# Patient Record
Sex: FEMALE | Race: OTHER | URBAN - METROPOLITAN AREA
[De-identification: names, ages, dates, MRNs, and addresses within clinical notes are randomized per-mention and may not be internally consistent; named-entity substitution may affect disease eponyms.]

---

## 2022-01-20 ENCOUNTER — HOSPITAL ENCOUNTER (OUTPATIENT)
Dept: PHYSICAL THERAPY | Age: 27
Discharge: HOME | End: 2022-01-20
Payer: COMMERCIAL

## 2022-01-20 DIAGNOSIS — M22.2X2 PATELLOFEMORAL SYNDROME OF BOTH KNEES: ICD-10-CM

## 2022-01-20 DIAGNOSIS — M25.562 PAIN IN BOTH KNEES, UNSPECIFIED CHRONICITY: ICD-10-CM

## 2022-01-20 DIAGNOSIS — M25.561 PAIN IN BOTH KNEES, UNSPECIFIED CHRONICITY: ICD-10-CM

## 2022-01-20 DIAGNOSIS — M22.2X1 PATELLOFEMORAL SYNDROME OF BOTH KNEES: ICD-10-CM

## 2022-01-20 PROCEDURE — 97110 THERAPEUTIC EXERCISES: CPT

## 2022-01-20 PROCEDURE — 97161 PT EVAL LOW COMPLEX 20 MIN: CPT

## 2022-01-20 PROCEDURE — 97140 MANUAL THERAPY 1/> REGIONS: CPT

## 2022-01-20 NOTE — PROGRESS NOTES
Bea Myers  : 1995  Payor: Ernesto Myrick / Plan: [facility] / Product Type: PPO /  [address]  at [address]  [address], [address]  [phone]   [phone]                                                            Carlos [facility] Jeremy [place]stud[place], [place]      OUTPATIENT PHYSICAL THERAPY: Daily Treatment Note 2022 Visit Count:  Visit count could not be calculated. Make sure you are using a visit which is associated with an episode. Tx Diagnosis:  Pain in right knee (M25.561)  Pain in left knee (M25.562)  Patellofemoral disorders, right knee (M22.2X1)  Patellofemoral disorders, left knee (M22.2X2)      LEFT KNEE WORSE THAN RIGHT. PFPS, TRACKING ISSUE PRESENT       Pre-treatment Symptoms/Complaints: See Initial Eval Dated 22 for more details. Pain: Initial:0/10  Medications Last Reviewed:  2022     Post Session: 0/10   Updated Objective Findings: See Initial Eval for more details. TREATMENT:   THERAPEUTIC EXERCISE: (10 minutes):  Exercises per grid below to improve mobility, strength and balance. Required minimal visual, verbal and manual cues to promote proper body alignment and promote proper body posture. Progressed resistance and complexity of movement as indicated. Date:  2022 Date:   Date:     Activity/Exercise Parameters Parameters Parameters   Education HEP, POC, PT goals, anatomy/pathology     Clamshells 10X10\" Yellow     SLR 10X10\"                                         MANUAL THERAPY: (15 minutes): Joint mobilization, Soft tissue mobilization was utilized and necessary because of the patient's restricted joint motion and restricted motion of soft tissue mobility. Date  2022    Technique Used Grade  Level # Time(s) Effect while being performed   Ute Taping    B knees, L > R                                                           HEP Log Date 1.    2022   2.  2022   3. 1/20/2022   4.    5.           Small[employer] Portal  Treatment/Session Summary:    Response to Treatment: Pt demonstrated understanding of POC and initial HEP. No increase in pain or adverse reactions. Improvement with Bucio taping B - no pain with side lunge   Communication/Consultation:  POC, HEP, PT goals, Faxed initial evaluation to MD.   Equipment provided today: HEP Handout   Recommendations/Intent for next treatment session:   Next visit will focus on Manual Therapy Core Stability Quad strengthening Hip strengthening soft tissue mobilization. Treatment Plan of Care Effective Dates: 1/20/2022 TO 4/20/2022 (90 days).   Frequency/Duration: 2 times a week for 90 Days             Total Treatment Billable Duration:   25  Rx plus Eval      Liz Reynoso DPT    Future Appointments   Date Time Provider Eddi Morales   1/20/2022 11:00 AM DAVE Streeter   2/24/2022  1:00 PM Olive Spence PA SSA POAI POA

## 2022-01-20 NOTE — THERAPY EVALUATION
Willi Bermudez  : 1995  Primary: Nadia Rome Of Micah Rojas*  Secondary:  [facility]  [address], [address]  [phone]   [phone]           OUTPATIENT PHYSICAL THERAPY:Initial Assessment 2022    ICD-10: Treatment Diagnosis:  Pain in right knee (M25.561)  Pain in left knee (M25.562)  Patellofemoral disorders, right knee (M22.2X1)  Patellofemoral disorders, left knee (M22.2X2)          Precautions/Allergies:   Patient has no known allergies. Fall Risk Score: 0 (? 5 = High Risk)  MD Orders: Eval and Treat  MEDICAL/REFERRING DIAGNOSIS:  Pain in both knees, unspecified chronicity [M25.561, M25.562]  Patellofemoral syndrome of both knees [M22.2X1, M22.2X2]   DATE OF ONSET: 22  REFERRING PHYSICIAN: Carlos Spence PA  RETURN PHYSICIAN APPOINTMENT: 22     INITIAL ASSESSMENT:  Ms. Juno Peres presents to physical therapy with decreased strength, ROM, joint mobility, functional mobility. These S/S are consistent with patella pain bilaterally---likely patellofemoral pain 'syndrome'  / tracking difficulty--I expect this to improve with short bout of taping (Rupinder) as well as strengthening        . Patient will benefit from skilled physical therapy for manual therapeutic techniques (as appropriate), therapeutic exercises and activities, balance and comprehensive home exercises program to address current impairments and functional limitations. Willi Bermudez will benefit from skilled PT (medically necessary) in order to address above deficits affecting participation in basic ADLs and overall functional tolerance. PROBLEM LIST (Impacting functional limitations):   1. Decreased Strength  2. Decreased ADL/Functional Activities  3. Decreased Transfer Abilities  4. Decreased Ambulation Ability/Technique  5. Decreased Balance  6. Increased Pain  7. Decreased Joint mobility/Flexibility   8.  Decreased Activity Tolerance  9. Decreased [place] with Home Exercise Program INTERVENTIONS PLANNED:   1. Balance Exercise  2. Bed Mobility  3. Cold  4. Cryotherapy  5. Family Education  6. Gait Training  7. Heat  8. Home Exercise Program (HEP)  9. Manual Therapy  10. Neuromuscular Re-education/Strengthening  11. Range of Motion (ROM)  12. Therapeutic Activites  13. Therapeutic Exercise/Strengthening  14. Transfer Training  15. Ultrasound (US)  16. Vasopneumatic Compression  17. Aquatic Therapy          TREATMENT PLAN:  Effective Dates: 1/20/2022 TO 4/20/2022 (90 days). Frequency/Duration: 2 times a week for 90 Days    GOALS: (Goals have been discussed and agreed upon with patient.)   Short-Term Goals~8 weeks  Goal Met   1. Unknown Bench will be independent with HEP for strength and ROM 1.  [] Date:   2. Unknown  will be able to work out without restrictions 2. [] Date:   3. Unknown Bench will be able to negotiate stairs without pain \"(primarily descent) 3. [] Date:   4. Unknown  will be able to drive car (manual transmission) without pain. 4.  [] Date:          Outcome Measure: Tool Used: Knee Injury and Osteoarthritis Outcome Form (KOOS-JR.)  SCORE: None (0) Mild (1) Moderate (2) Severe (3) Extreme (4)   Stiffness:         1. How severe is your knee stiffness after first waking in the morning? [x] [] [] [] []   Pain:         What amount of knee pain have you experienced in the last week   doing the following activities? 2. Twisting/pivoting on your knee: [] [] [x] [] []   3. Straightening knee fully: [] [] [x] [] []   4. Going up or down stairs [] [] [x] [] []   5. Standing upright [] [x] [] [] []   Function, daily living        6. Rising from sitting [] [x] [] [] []   7.  Bending to floor/ an object [] [x] [] [] []     Score:  Initial: 9 (Interval: 63.776) 1/20/2022/28 Most Recent: TBD/28 (Date: -- )   Interpretation of Score: Questions each scored on a 4 point scale with 4 representing the worst possible score and 0 representing the best possible score. The higher the point total, the worse the knee pain translating to a lower percentage of patient functioning. Medical Necessity:   · Skilled intervention continues to be required due to current impairment. Reason for Services/Other Comments:  · Patient continues to require skilled intervention due to patient continues to present with impairments assessed at initial evaluation and requiring skilled physical therapy to meet goals for PT. Total Treatment Duration:         Rehabilitation Potential For Stated Goals: Good  Regarding Mayelin Kam Miller's therapy, I certify that the treatment plan above will be carried out by a therapist or under their direction. Thank you for this referral,  Paddy Prieto DPT     Referring Physician Signature: SOILA Hutchison              Date                  HISTORY:   History of Present Injury/Illness (Reason for Referral):  *I started having knee pain Summer of 2020. I gained weight and started to work out and my knee pain got so bad I couldn't barely go up and down stairs. I was following [employer] videos for working out but my knees were unable to do it. My family has bad hx of OA so eventually in November I got a  and I told my  about it and we don't do things that hurt my knees. I'm afraid of the pain I had the year before last year. I like to [place] with FlexFit and we do a lot of core work and DB weights - I go 3x/week but now 2x/week. -Present Symptoms (on day of evaluation):       Pain Severity:  · Currently: 0/10  · Best: 0/10  · Worst: 4/10 curtsy lunges, side lunges    · Aggravating factors: curtsy lunges   · Relieving factors: Rest  · Irritability: Low (Onset of pain is is longer than the time it takes for Pain to go away)    Past Medical History/Comorbidities:   Ms. Karen Escudero  has no past medical history on file.   Ms. Karen Escudero  has no past surgical history on file.    Active Ambulatory Problems     Diagnosis Date Noted    No Active Ambulatory Problems     Resolved Ambulatory Problems     Diagnosis Date Noted    No Resolved Ambulatory Problems     No Additional Past Medical History     Social History/Living Environment:        Social History     Socioeconomic History    Marital status: SINGLE     Spouse name: Not on file    Number of children: Not on file    Years of education: Not on file    Highest education level: Not on file   Occupational History    Not on file   Tobacco Use    Smoking status: Never Smoker    Smokeless tobacco: Never Used   Substance and Sexual Activity    Alcohol use: Not on file    Drug use: Not on file    Sexual activity: Not on file   Other Topics Concern    Not on file   Social History Narrative    Not on file     Social Determinants of Health     Financial Resource Strain:     Difficulty of Paying Living Expenses: Not on file   Food Insecurity:     Worried About Running Out of Food in the Last Year: Not on file    [place] of Food in the Last Year: Not on file   Transportation Needs:     Lack of Transportation (Medical): Not on file    Lack of Transportation (Non-Medical):  Not on file   Physical Activity: Insufficiently Active    Days of Exercise per Week: 3 days    Minutes of Exercise per Session: 30 min   Stress:     Feeling of Stress : Not on file   Social Connections:     Frequency of Communication with Friends and Family: Not on file    Frequency of Social Gatherings with Friends and Family: Not on file    Attends Pentecostalism Services: Not on file    Active Member of Clubs or Organizations: Not on file    Attends Club or Organization Meetings: Not on file    Marital Status: Not on file   Intimate Partner Violence: Not At Risk    Fear of Current or Ex-Partner: No    Emotionally Abused: No    Physically Abused: No    Sexually Abused: No   Housing Stability:     Unable to Pay for Housing in the Last Year: Not on file    Number of Places Lived in the Last Year: Not on file    Unstable Housing in the Last Year: Not on file     Prior Level of Function/Work/Activity:  Normal  Previous Treatment Approach  none  Other Clinical Tests:  none  Current Medications:    Current Outpatient Medications:     naproxen (NAPROSYN) 500 mg tablet, Take 1 Tablet by mouth two (2) times daily (with meals). , Disp: 28 Tablet, Rfl: 0      Ambulatory/Rehab Services H2 Model Falls Risk Assessment    Risk Factors:       No Risk Factors Identified Ability to Rise from Chair:       (0)  Ability to rise in a single movement    Falls Prevention Plan:       No modifications necessary   Total: (5 or greater = High Risk): 0    ©2010 [facility] [employer]. All Rights Reserved. [facility] Patent #[id number]. Federal Law prohibits the replication, distribution or use without written permission from [facility] The car easily beat         Date Last Reviewed:  1/20/2022   Number of Personal Factors/Comorbidities that affect the Plan of Care: 0: LOW COMPLEXITY   EXAMINATION:   Observation/Orthostatic Postural Assessment:   Gait:  Normal      KNEE Left  Right   Skin Intact Intact   Atrophy None None   Effusion none none   ROM  Full no pain Full  No pain   Palpation Tender over the lateral patella facet and mild medial joint  over  lateral patella facet   Patellar Tracking Normal: J sign: negative.  Same   Crepitus 1+ 1+   Patellar Apprehension Negative Negative   Melissa Test  negative  negative   Pivot Shift Negative Negative   Post Drawer Negative Negative   Varus  @ 0 and 30deg Negative Negative   Valgus @ 0 and 30deg Negative Negative   Gait Normal  Normal          Strength:     Eval Date:  Re-Assess Date:  Re-Assess Date:      RIGHT LEFT RIGHT LEFT RIGHT LEFT   Knee Flexion  5/5  5/5           Knee Extension  5/5  5/5           Hip Flexion  5/5  5/5           Hip Abduction  5/5  5/5           Hip Extension  5/5  5/5           Ankle Dorsiflexion   5/5 5/5            5/5 5/5             Special Tests:   Lachman: Negative  Anterior Drawer: Negative  Valgus Stress: Negative  Varus Stress: Negative  Melissa: Negative    Neurological Screen:  No radiating symptoms down leg    Functional Mobility:   Sit to Stand=  Squat=  Single Leg Step Down=      Balance and Mobility:  Test Result   Timed up and Go <10 Seconds   30 second Sit to Stand           Body Structures Involved:  1. Bones  2. Joints  3. Muscles  4. Ligaments Body Functions Affected:  1. Sensory/Pain  2. Neuromusculoskeletal  3. Movement Related Activities and Participation Affected:  1. Mobility  2. Self Care   Number of elements that affect the Plan of Care: 4+: HIGH COMPLEXITY   CLINICAL PRESENTATION:   Presentation: Stable and uncomplicated: LOW COMPLEXITY   CLINICAL DECISION MAKING:      Use of outcome tool(s) and clinical judgement create a POC that gives a: Clear prediction of patient's progress: LOW COMPLEXITY   See treatment note for associated treatment provided today.       Future Appointments   Date Time Provider Eddi Morales   2/24/2022  1:00 PM Carlos [facility] Elissa [place], [address] [facility] [place], [place]

## 2022-01-25 ENCOUNTER — HOSPITAL ENCOUNTER (OUTPATIENT)
Dept: PHYSICAL THERAPY | Age: 27
Discharge: HOME | End: 2022-01-25
Payer: COMMERCIAL

## 2022-01-25 PROCEDURE — 97110 THERAPEUTIC EXERCISES: CPT

## 2022-01-25 PROCEDURE — 97140 MANUAL THERAPY 1/> REGIONS: CPT

## 2022-01-25 NOTE — PROGRESS NOTES
Macie Fantasma  : 1995  Payor: Marlo Wheeler / Plan: [facility] / Product Type: PPO /  [address]  at [facility]  [address], [address]  [phone]   [phone]                                                            [facility] [facility] Elissa [place], [address]      OUTPATIENT PHYSICAL THERAPY: Daily Treatment Note 2022 Visit Count:  2    Tx Diagnosis:  Pain in right knee (M25.561)  Pain in left knee (M25.562)  Patellofemoral disorders, right knee (M22.2X1)  Patellofemoral disorders, left knee (M22.2X2)      LEFT KNEE WORSE THAN RIGHT. PFPS, TRACKING ISSUE PRESENT       Pre-treatment Symptoms/Complaints: See Initial Eval Dated 22 for more details. Patient reports both knees have been hurting some. Pain: Initial:0/10  Medications Last Reviewed:  2022     Post Session: 0/10   Updated Objective Findings: See Initial Eval for more details. TREATMENT:   THERAPEUTIC EXERCISE: (45 minutes):  Exercises per grid below to improve mobility, strength and balance. Required minimal visual, verbal and manual cues to promote proper body alignment and promote proper body posture. Progressed resistance and complexity of movement as indicated. Date:  2022 Date:   Date:     Activity/Exercise Parameters Parameters Parameters   Education HEP, POC, PT goals, anatomy/pathology     Clamshells 10X10\" Yellow     SLR 10X10\" # 4     Hip abd X 30 B     bridging X 30 B     shuttle X 20 # 75 B     Nu step X 10 level 2     Single leg bridging X 30 B     Standing hamstring curl X 30 # 4                       MANUAL THERAPY: (10 minutes): Joint mobilization, Soft tissue mobilization was utilized and necessary because of the patient's restricted joint motion and restricted motion of soft tissue mobility.         Date  2022    Technique Used Grade  Level # Time(s) Effect while being performed   Ute Taping    B knees, L > R    Patella mobs To decrease tightness and tenderness                                                   HEP Log Date 1.    1/25/2022   2.  1/25/2022   3. 1/25/2022   4.    5.           [employer] Portal  Treatment/Session Summary:    Response to Treatment: Pt demonstrated understanding of POC and initial HEP. No increase in pain or adverse reactions. Patient tolerated treatment well today. Needs to continue to work on quad and hip strength. Communication/Consultation:  POC, HEP, PT goals, Faxed initial evaluation to MD.   Equipment provided today: HEP Handout   Recommendations/Intent for next treatment session:   Next visit will focus on Manual Therapy Core Stability Quad strengthening Hip strengthening soft tissue mobilization. Treatment Plan of Care Effective Dates: 1/25/2022 TO 4/20/2022 (90 days).   Frequency/Duration: 2 times a week for 90 Days             Total Treatment Billable Duration:   45  Rx   PT Patient Time In/Time Out  Time In: 1100  Time Out: [address] [place], [place]    Future Appointments   Date Time Provider Eddi Morales   1/27/2022  7:00 PM [facility]   2/1/2022 11:00 AM [facility]   2/3/2022 11:00 AM [place] [facility]   2/8/2022 11:00 AM [place] [facility]   2/10/2022 11:00 AM [place] [facility]   2/15/2022 11:00 AM [place] [facility]   2/17/2022 11:00 [facility]   2/22/2022 11:00 AM [place] [facility]   2/24/2022  1:00 PM Adonay Spence PA SSA POAI POA

## 2022-01-27 ENCOUNTER — HOSPITAL ENCOUNTER (OUTPATIENT)
Dept: PHYSICAL THERAPY | Age: 27
Discharge: HOME | End: 2022-01-27
Payer: COMMERCIAL

## 2022-01-27 NOTE — PROGRESS NOTES
Prateek Mahmood  : 1995  Primary: Mariella Hernández Of Micah Rojas*  Secondary:  [facility]  [address], [facility] [address]  [phone]   [phone]      OUTPATIENT DAILY NOTE    NAME/AGE/GENDER: Prateek Mahmood is a 23 y.o. female. DATE: 2022    Ms. Natalia Heardch for today's appointment due to schedule conflcit.  Pramod Llanes, PTA

## 2022-02-01 ENCOUNTER — HOSPITAL ENCOUNTER (OUTPATIENT)
Dept: PHYSICAL THERAPY | Age: 27
Discharge: HOME | End: 2022-02-01
Payer: COMMERCIAL

## 2022-02-01 PROCEDURE — 97110 THERAPEUTIC EXERCISES: CPT

## 2022-02-01 NOTE — PROGRESS NOTES
Marcel Fail  : 1995  Payor: Albertha Osler / Plan: [facility] / Product Type: PPO /  [address]  at [address]  [address], [address]  [phone]   [phone]                                                            mya [facility] [place], [place]      OUTPATIENT PHYSICAL THERAPY: Daily Treatment Note 2022 Visit Count:  3    Tx Diagnosis:  Pain in right knee (M25.561)  Pain in left knee (M25.562)  Patellofemoral disorders, right knee (M22.2X1)  Patellofemoral disorders, left knee (M22.2X2)      LEFT KNEE WORSE THAN RIGHT. PFPS, TRACKING ISSUE PRESENT       Pre-treatment Symptoms/Complaints: See Initial Eval Dated 22 for more details. Patient reports knees are still sore, but not as bad as last week. Pain: Initial:0/10  Medications Last Reviewed:  2022     Post Session: 0/10   Updated Objective Findings: See Initial Eval for more details. TREATMENT:   THERAPEUTIC EXERCISE: (45 minutes):  Exercises per grid below to improve mobility, strength and balance. Required minimal visual, verbal and manual cues to promote proper body alignment and promote proper body posture. Progressed resistance and complexity of movement as indicated. Date:  2022 Date:   Date:     Activity/Exercise Parameters Parameters Parameters   Education HEP, POC, PT goals, anatomy/pathology     Clamshells 10X10\" Yellow     SLR 10X10\" # 4     Hip abd X 30 B     bridging X 30 B     shuttle X 20 # 75 B     Nu step X 10 level 2     Single leg bridging X 30 B     Standing hamstring curl X 30 # 4     3 way hip machine  Flex/abd #15  2 x 10  Ext #25 2 x 10                 MANUAL THERAPY: (0 minutes): Joint mobilization, Soft tissue mobilization was utilized and necessary because of the patient's restricted joint motion and restricted motion of soft tissue mobility.         Date  2022    Technique Used Grade  Level # Time(s) Effect while being performed   [place]ger Taping    B knees, L > R    Patella mobs    To decrease tightness and tenderness                                                   HEP Log Date 1.    2/1/2022   2.  2/1/2022   3. 2/1/2022   4.    5.           [employer] Portal  Treatment/Session Summary:    Response to Treatment: Pt demonstrated understanding of POC and initial HEP. No increase in pain or adverse reactions. Patient tolerated treatment well today. Patient seems very pleased with progress. Communication/Consultation:  POC, HEP, PT goals, Faxed initial evaluation to MD.   Equipment provided today: HEP Handout   Recommendations/Intent for next treatment session:   Next visit will focus on Manual Therapy Core Stability Quad strengthening Hip strengthening soft tissue mobilization. Treatment Plan of Care Effective Dates: 2/1/2022 TO 4/20/2022 (90 days).   Frequency/Duration: 2 times a week for 90 Days             Total Treatment Billable Duration:   45  Rx   PT Patient Time In/Time Out  Time In: 1100  Time Out: [place], [place]    Future Appointments   Date Time Provider Eddi Morales   2/3/2022 11:00 AM [facility]   2/8/2022 11:00 AM [facility]   2/10/2022 11:00 AM [facility]   2/15/2022 11:00 AM [facility]   2/17/2022 11:00 AM [facility]   2/22/2022 11:00 AM [facility]   2/24/2022  1:00 PM Melanie Spence PA SSA POAI POA

## 2022-02-03 ENCOUNTER — HOSPITAL ENCOUNTER (OUTPATIENT)
Dept: PHYSICAL THERAPY | Age: 27
Discharge: HOME | End: 2022-02-03
Payer: COMMERCIAL

## 2022-02-03 PROCEDURE — 97110 THERAPEUTIC EXERCISES: CPT

## 2022-02-03 NOTE — PROGRESS NOTES
Willi Bermudez  : 1995  Payor: Blanca Martini / Plan: [place] [facility] / Product Type: PPO /  [id number]  at [facility]  [address], [address]  [phone]   [phone]                                                            Carlos Urias Elissa [place]      OUTPATIENT PHYSICAL THERAPY: Daily Treatment Note 2/3/2022 Visit Count:  4    Tx Diagnosis:  Pain in right knee (M25.561)  Pain in left knee (M25.562)  Patellofemoral disorders, right knee (M22.2X1)  Patellofemoral disorders, left knee (M22.2X2)      LEFT KNEE WORSE THAN RIGHT. PFPS, TRACKING ISSUE PRESENT       Pre-treatment Symptoms/Complaints: See Initial Eval Dated 22 for more details. Patient reports right knee is sore today from walking down  5 flights of stairs at work. Pain: Initial:0/10  Medications Last Reviewed:  2/3/2022     Post Session: 0/10   Updated Objective Findings: See Initial Eval for more details. TREATMENT:   THERAPEUTIC EXERCISE: (45 minutes):  Exercises per grid below to improve mobility, strength and balance. Required minimal visual, verbal and manual cues to promote proper body alignment and promote proper body posture. Progressed resistance and complexity of movement as indicated.      Date:  2/3/2022 Date:   Date:     Activity/Exercise Parameters Parameters Parameters   Education HEP, POC, PT goals, anatomy/pathology     [facility]     Hip abd X 30 B     bridging X 30 B     shuttle 3 x 10 # 91 B     Nu step X 10 level 2     Single leg bridging X 30 B     Standing hamstring curl      3 way hip machine  Flex/abd #15  2 x 10  Ext #25 2 x 10     Dead lifts  #9 3 x 10     Hamstring curls machine # 25 3 x 10     Sit to stand  X 10                 MANUAL THERAPY: (0 minutes): Joint mobilization, Soft tissue mobilization was utilized and necessary because of the patient's restricted joint motion and restricted motion of soft tissue mobility. Date  2/3/2022    Technique Used Grade  Level # Time(s) Effect while being performed   Unger Taping    B knees, L > R    Patella mobs    To decrease tightness and tenderness                                                   HEP Log Date 1.    2/3/2022   2.  2/3/2022   3. 2/3/2022   4.    5.           [employer] Portal  Treatment/Session Summary:    Response to Treatment: Pt demonstrated understanding of POC and initial HEP. No increase in pain or adverse reactions. Patient tolerated treatment well today. Good motivation Needs to continue to work on quad strength   Communication/Consultation:  POC, HEP, PT goals, Faxed initial evaluation to MD.   Equipment provided today: HEP Handout   Recommendations/Intent for next treatment session:   Next visit will focus on Manual Therapy Core Stability Quad strengthening Hip strengthening soft tissue mobilization. Treatment Plan of Care Effective Dates: 2/3/2022 TO 4/20/2022 (90 days).   Frequency/Duration: 2 times a week for 90 Days             Total Treatment Billable Duration:   45  Rx   PT Patient Time In/Time Out  Time In: 1100  Time Out: [place], [place]    Future Appointments   Date Time Provider Eddi Morales   2/9/2022 10:15 AM [facility]   2/10/2022 11:00 AM Jose A Horan [facility]   2/15/2022 11:00 AM [facility]   2/17/2022 11:00 AM Jose A Hor[facility]   2/22/2022 11:00 [facility]   2/24/2022  1:00 PM Jeff Spence PA SSA POAI POA

## 2022-02-08 ENCOUNTER — APPOINTMENT (OUTPATIENT)
Dept: PHYSICAL THERAPY | Age: 27
End: 2022-02-08
Payer: COMMERCIAL

## 2022-02-09 ENCOUNTER — APPOINTMENT (OUTPATIENT)
Dept: PHYSICAL THERAPY | Age: 27
End: 2022-02-09
Payer: COMMERCIAL

## 2022-02-10 ENCOUNTER — HOSPITAL ENCOUNTER (OUTPATIENT)
Dept: PHYSICAL THERAPY | Age: 27
Discharge: HOME | End: 2022-02-10
Payer: COMMERCIAL

## 2022-02-10 PROCEDURE — 97110 THERAPEUTIC EXERCISES: CPT

## 2022-02-10 NOTE — PROGRESS NOTES
Cristin Aden  : 1995  Payor: Claire Christie / Plan: [facility] / Product Type: PPO /  [address]  at [facility]  [address], [address]  [phone]   [phone]                                                            Carlos [facility] [place], [place], [place]      OUTPATIENT PHYSICAL THERAPY: Daily Treatment Note 2/10/2022 Visit Count:  5    Tx Diagnosis:  Pain in right knee (M25.561)  Pain in left knee (M25.562)  Patellofemoral disorders, right knee (M22.2X1)  Patellofemoral disorders, left knee (M22.2X2)      LEFT KNEE WORSE THAN RIGHT. PFPS, TRACKING ISSUE PRESENT       Pre-treatment Symptoms/Complaints: See Initial Eval Dated 22 for more details. Patient reports she did a lot of walking over the weekend and it didn't really hurt. Pain: Initial:0/10  Medications Last Reviewed:  2/10/2022     Post Session: 0/10   Updated Objective Findings: See Initial Eval for more details. TREATMENT:   THERAPEUTIC EXERCISE: (45 minutes):  Exercises per grid below to improve mobility, strength and balance. Required minimal visual, verbal and manual cues to promote proper body alignment and promote proper body posture. Progressed resistance and complexity of movement as indicated.      Date:  2/10/2022 Date:   Date:     Activity/Exercise Parameters Parameters Parameters   Education HEP, POC, PT goals, anatomy/pathology     Marcia      SLR      Hip abd      bridging      shuttle 3 x 10 # 100 B     Nu step X 10 level 2     Single leg bridging      Standing hamstring curl      3 way hip machine  Flex/abd #15  2 x 10  Ext #25 2 x 10     Dead lifts  #9 3 x 10     Hamstring curls machine # 25 3 x 10     Sit to stand       squats # 10 2 x 10                             MANUAL THERAPY: (0 minutes): Joint mobilization, Soft tissue mobilization was utilized and necessary because of the patient's restricted joint motion and restricted motion of soft tissue mobility. Date  2/10/2022    Technique Used Grade  Level # Time(s) Effect while being performed   Unger Taping    B knees, L > R    Patella mobs    To decrease tightness and tenderness                                                   HEP Log Date 1.    2/10/2022   2.  2/10/2022   3. 2/10/2022   4.    5.           [employer] Portal  Treatment/Session Summary:    Response to Treatment: Pt demonstrated understanding of POC and initial HEP. No increase in pain or adverse reactions. Patient tolerated treatment well today. Good motivation. Patient plans to try to go back to some of her everyday activities she did before the knee pain stopped them. Communication/Consultation:  POC, HEP, PT goals, Faxed initial evaluation to MD.   Equipment provided today: HEP Handout   Recommendations/Intent for next treatment session:   Next visit will focus on Manual Therapy Core Stability Quad strengthening Hip strengthening soft tissue mobilization. Treatment Plan of Care Effective Dates: 2/10/2022 TO 4/20/2022 (90 days).   Frequency/Duration: 2 times a week for 90 Days             Total Treatment Billable Duration:   45  Rx   PT Patient Time In/Time Out  Time In: 1100  Time Out: [place], [place]    Future Appointments   Date Time Provider Eddi Morales   2/15/2022 11:00 AM [facility]   [facility]17[facility]2022 [facility]   2/22/2022 11:00 AM Edgar BLANCO [facility]   2/24/2022  1:00 PM Fortunato Spence PA SSA POAI POA

## 2022-02-15 ENCOUNTER — HOSPITAL ENCOUNTER (OUTPATIENT)
Dept: PHYSICAL THERAPY | Age: 27
Discharge: HOME | End: 2022-02-15
Payer: COMMERCIAL

## 2022-02-15 PROCEDURE — 97110 THERAPEUTIC EXERCISES: CPT

## 2022-02-15 NOTE — PROGRESS NOTES
Ashley Harrison  : 1995  Payor: Chanelle Gunn / Plan: [facility] / Product Type: PPO /  [address]  at [facility]  [address], [address]  [phone]   [phone]                                                            Carlos [facility] Elissa [place]      OUTPATIENT PHYSICAL THERAPY: Daily Treatment Note 2/15/2022 Visit Count:  6    Tx Diagnosis:  Pain in right knee (M25.561)  Pain in left knee (M25.562)  Patellofemoral disorders, right knee (M22.2X1)  Patellofemoral disorders, left knee (M22.2X2)      LEFT KNEE WORSE THAN RIGHT. PFPS, TRACKING ISSUE PRESENT       Pre-treatment Symptoms/Complaints: See Initial Eval Dated 22 for more details. Patient reports knee hurt some over the weekend, but it got better as weekend progressed. Pain: Initial:0/10  Medications Last Reviewed:  2/15/2022     Post Session: 0/10   Updated Objective Findings: See Initial Eval for more details. TREATMENT:   THERAPEUTIC EXERCISE: (55 minutes):  Exercises per grid below to improve mobility, strength and balance. Required minimal visual, verbal and manual cues to promote proper body alignment and promote proper body posture. Progressed resistance and complexity of movement as indicated.      Date:  2/15/2022 Date:   Date:     Activity/Exercise Parameters Parameters Parameters   Education HEP, POC, PT goals, anatomy/pathology     Clamshells      SLR      Hip abd      bridging      shuttle 3 x 10 # 100 B     Nu step X 10 level 2     Single leg bridging      Standing hamstring curl      3 way hip machine  Flex/abd #15  2 x 10  Ext #25 2 x 10     Dead lifts  #9 3 x 10     Hamstring curls machine # 25 3 x 10     Sit to stand       squats # 10 2 x 10     lunges 2 x 10     Knee ext machine  # 10 x 10                 MANUAL THERAPY: (0 minutes): Joint mobilization, Soft tissue mobilization was utilized and necessary because of the patient's restricted joint motion and restricted motion of soft tissue mobility. Date  2/15/2022    Technique Used Grade  Level # Time(s) Effect while being performed   Unger Taping    B knees, L > R    Patella mobs    To decrease tightness and tenderness                                                   HEP Log Date 1.    2/15/2022   2.  2/15/2022   3. 2/15/2022   4.    5.           [employer] Portal  Treatment/Session Summary:    Response to Treatment: Pt demonstrated understanding of POC and initial HEP. No increase in pain or adverse reactions. Patient tolerated treatment well today. Good motivation. Patient seems pleased with progress and overall strength. Communication/Consultation:  POC, HEP, PT goals, Faxed initial evaluation to MD.   Equipment provided today: HEP Handout   Recommendations/Intent for next treatment session:   Next visit will focus on Manual Therapy Core Stability Quad strengthening Hip strengthening soft tissue mobilization. Treatment Plan of Care Effective Dates: 2/15/2022 TO 4/20/2022 (90 days).   Frequency/Duration: 2 times a week for 90 Days             Total Treatment Billable Duration:   55  Rx   PT Patient Time In/Time Out  Time In: 1100  Time Out: [address] [place], [place]    Future Appointments   Date Time Provider Eddi Morales   2/17/2022 11:00 AM [facility]   2/22/2022 11:00 AM [facility]   2/24/2022  1:00 PM Mac Spence PA SSA POAI POA

## 2022-02-17 ENCOUNTER — HOSPITAL ENCOUNTER (OUTPATIENT)
Dept: PHYSICAL THERAPY | Age: 27
Discharge: HOME | End: 2022-02-17
Payer: COMMERCIAL

## 2022-02-17 PROCEDURE — 97110 THERAPEUTIC EXERCISES: CPT

## 2022-02-17 NOTE — PROGRESS NOTES
Juliana Crew  : 1995  Payor: Yokasta Figueroa / Plan: [place] [facility] / Product Type: PPO /  [address]  at [facility]  [address], [address]  [phone]   [phone]                                                            Carlos [facility] Elissa [place]      OUTPATIENT PHYSICAL THERAPY: Daily Treatment Note 2022 Visit Count:  7    Tx Diagnosis:  Pain in right knee (M25.561)  Pain in left knee (M25.562)  Patellofemoral disorders, right knee (M22.2X1)  Patellofemoral disorders, left knee (M22.2X2)      LEFT KNEE WORSE THAN RIGHT. PFPS, TRACKING ISSUE PRESENT       Pre-treatment Symptoms/Complaints: See Initial Eval Dated 22 for more details. Patient reports left knee was hurting when she woke up this morning. Pain: Initial:0/10  Medications Last Reviewed:  2022     Post Session: 0/10   Updated Objective Findings: See Initial Eval for more details. TREATMENT:   THERAPEUTIC EXERCISE: (45 minutes):  Exercises per grid below to improve mobility, strength and balance. Required minimal visual, verbal and manual cues to promote proper body alignment and promote proper body posture. Progressed resistance and complexity of movement as indicated.      Date:  2022 Date:   Date:     Activity/Exercise Parameters Parameters Parameters   Education HEP, POC, PT goals, anatomy/pathology     Clamshells      SLR      Hip abd      bridging      shuttle 3 x 10 # 100 B     Nu step X 10 level 2     Single leg bridging      Standing hamstring curl      3 way hip machine  Flex/abd #15  2 x 10  Ext #25 2 x 10     Dead lifts  #9 3 x 10     Hamstring curls machine # 25 3 x 10     Sit to stand       squats # 10 2 x 10     lunges 2 x 10     Knee ext machine  # 10 x 10     Resistive lateral squats Green TB x10                 MANUAL THERAPY: (0 minutes): Joint mobilization, Soft tissue mobilization was utilized and necessary because of the patient's restricted joint motion and restricted motion of soft tissue mobility. Date  2/17/2022    Technique Used Grade  Level # Time(s) Effect while being performed   Unger Taping    B knees, L > R    Patella mobs    To decrease tightness and tenderness                                                   HEP Log Date 1.    2/17/2022   2.  2/17/2022   3. 2/17/2022   4.    5.           [employer] Portal  Treatment/Session Summary:    Response to Treatment: Pt demonstrated understanding of POC and initial HEP. No increase in pain or adverse reactions. Patient tolerated treatment well today. Good motivation. Patient seems pleased with progress and overall strength. Patient indicates she can see an improvement with therapy. Communication/Consultation:  POC, HEP, PT goals, Faxed initial evaluation to MD.   Equipment provided today: HEP Handout   Recommendations/Intent for next treatment session:   Next visit will focus on Manual Therapy Core Stability Quad strengthening Hip strengthening soft tissue mobilization. Treatment Plan of Care Effective Dates: 2/17/2022 TO 4/20/2022 (90 days).   Frequency/Duration: 2 times a week for 90 Days             Total Treatment Billable Duration:   45  Rx   PT Patient Time In/Time Out  Time In: 1100  Time Out: [place], [place]    Future Appointments   Date Time Provider Eddi Morales   2/22/2022 11:00 AM [facility]   2/24/2022  1:00 PM Sabine Spence PA SSA POAI POA

## 2022-02-22 ENCOUNTER — HOSPITAL ENCOUNTER (OUTPATIENT)
Dept: PHYSICAL THERAPY | Age: 27
Discharge: HOME | End: 2022-02-22
Payer: COMMERCIAL

## 2022-02-22 NOTE — PROGRESS NOTES
Jen Lynn  : 1995  Primary: Agata Calles Of Micah Rojas*  Secondary:  [facility]  [address] [place] [address], [facility] [address]  [phone]   [phone]      OUTPATIENT DAILY NOTE    NAME/AGE/GENDER: Jen Lynn is a 23 y.o. female. DATE: 2022    Ms. Dakota Garrett for today's appointment due to schedule conflcit.  Beth Mobley, PTA